# Patient Record
Sex: FEMALE | Race: WHITE | HISPANIC OR LATINO | Employment: OTHER | URBAN - METROPOLITAN AREA
[De-identification: names, ages, dates, MRNs, and addresses within clinical notes are randomized per-mention and may not be internally consistent; named-entity substitution may affect disease eponyms.]

---

## 2024-01-06 ENCOUNTER — HOSPITAL ENCOUNTER (OUTPATIENT)
Facility: CLINIC | Age: 74
Setting detail: OBSERVATION
Discharge: HOME | End: 2024-01-08
Attending: EMERGENCY MEDICINE | Admitting: STUDENT IN AN ORGANIZED HEALTH CARE EDUCATION/TRAINING PROGRAM
Payer: COMMERCIAL

## 2024-01-06 DIAGNOSIS — E87.29 HIGH ANION GAP METABOLIC ACIDOSIS: ICD-10-CM

## 2024-01-06 DIAGNOSIS — R06.00 DYSPNEA, UNSPECIFIED TYPE: ICD-10-CM

## 2024-01-06 DIAGNOSIS — J18.9 PNEUMONIA OF BOTH LUNGS DUE TO INFECTIOUS ORGANISM, UNSPECIFIED PART OF LUNG: ICD-10-CM

## 2024-01-06 DIAGNOSIS — J45.901 ASTHMA WITH ACUTE EXACERBATION, UNSPECIFIED ASTHMA SEVERITY, UNSPECIFIED WHETHER PERSISTENT: ICD-10-CM

## 2024-01-06 DIAGNOSIS — E87.6 HYPOKALEMIA: ICD-10-CM

## 2024-01-06 LAB
ATRIAL RATE - MUSE: 107 BPM
DIASTOLIC BLOOD PRESSURE - MUSE: NORMAL MMHG
INTERPRETATION ECG - MUSE: NORMAL
P AXIS - MUSE: 60 DEGREES
PR INTERVAL - MUSE: 128 MS
QRS DURATION - MUSE: 144 MS
QT - MUSE: 406 MS
QTC - MUSE: 542 MS
R AXIS - MUSE: 91 DEGREES
SYSTOLIC BLOOD PRESSURE - MUSE: NORMAL MMHG
T AXIS - MUSE: 39 DEGREES
VENTRICULAR RATE- MUSE: 107 BPM

## 2024-01-06 PROCEDURE — 93005 ELECTROCARDIOGRAM TRACING: CPT | Performed by: EMERGENCY MEDICINE

## 2024-01-06 PROCEDURE — 99285 EMERGENCY DEPT VISIT HI MDM: CPT | Mod: 25

## 2024-01-06 PROCEDURE — 250N000009 HC RX 250

## 2024-01-06 PROCEDURE — 94640 AIRWAY INHALATION TREATMENT: CPT

## 2024-01-06 RX ORDER — MAGNESIUM SULFATE HEPTAHYDRATE 40 MG/ML
2 INJECTION, SOLUTION INTRAVENOUS ONCE
Status: COMPLETED | OUTPATIENT
Start: 2024-01-07 | End: 2024-01-07

## 2024-01-06 RX ORDER — ONDANSETRON 2 MG/ML
4 INJECTION INTRAMUSCULAR; INTRAVENOUS ONCE
Status: COMPLETED | OUTPATIENT
Start: 2024-01-07 | End: 2024-01-07

## 2024-01-06 RX ORDER — IPRATROPIUM BROMIDE AND ALBUTEROL SULFATE 2.5; .5 MG/3ML; MG/3ML
SOLUTION RESPIRATORY (INHALATION)
Status: COMPLETED
Start: 2024-01-06 | End: 2024-01-06

## 2024-01-06 RX ORDER — METHYLPREDNISOLONE SODIUM SUCCINATE 125 MG/2ML
125 INJECTION, POWDER, LYOPHILIZED, FOR SOLUTION INTRAMUSCULAR; INTRAVENOUS ONCE
Status: COMPLETED | OUTPATIENT
Start: 2024-01-07 | End: 2024-01-07

## 2024-01-06 RX ORDER — IPRATROPIUM BROMIDE AND ALBUTEROL SULFATE 2.5; .5 MG/3ML; MG/3ML
3 SOLUTION RESPIRATORY (INHALATION)
Status: COMPLETED | OUTPATIENT
Start: 2024-01-07 | End: 2024-01-07

## 2024-01-06 RX ADMIN — IPRATROPIUM BROMIDE AND ALBUTEROL SULFATE 3 ML: .5; 3 SOLUTION RESPIRATORY (INHALATION) at 23:50

## 2024-01-07 ENCOUNTER — APPOINTMENT (OUTPATIENT)
Dept: RADIOLOGY | Facility: CLINIC | Age: 74
End: 2024-01-07
Attending: EMERGENCY MEDICINE
Payer: COMMERCIAL

## 2024-01-07 ENCOUNTER — APPOINTMENT (OUTPATIENT)
Dept: CT IMAGING | Facility: CLINIC | Age: 74
End: 2024-01-07
Attending: EMERGENCY MEDICINE
Payer: COMMERCIAL

## 2024-01-07 PROBLEM — E87.6 HYPOKALEMIA: Status: ACTIVE | Noted: 2024-01-07

## 2024-01-07 PROBLEM — R06.00 DYSPNEA, UNSPECIFIED TYPE: Status: ACTIVE | Noted: 2024-01-07

## 2024-01-07 PROBLEM — E87.29 HIGH ANION GAP METABOLIC ACIDOSIS: Status: ACTIVE | Noted: 2024-01-07

## 2024-01-07 PROBLEM — J45.901 ASTHMA WITH ACUTE EXACERBATION, UNSPECIFIED ASTHMA SEVERITY, UNSPECIFIED WHETHER PERSISTENT: Status: ACTIVE | Noted: 2024-01-07

## 2024-01-07 LAB
ALBUMIN UR-MCNC: 30 MG/DL
ANION GAP SERPL CALCULATED.3IONS-SCNC: 12 MMOL/L (ref 7–15)
ANION GAP SERPL CALCULATED.3IONS-SCNC: 19 MMOL/L (ref 7–15)
APPEARANCE UR: CLEAR
B-OH-BUTYR SERPL-SCNC: 2.1 MMOL/L
B-OH-BUTYR SERPL-SCNC: 2.6 MMOL/L
BASOPHILS # BLD AUTO: NORMAL 10*3/UL
BASOPHILS # BLD MANUAL: 0 10E3/UL (ref 0–0.2)
BASOPHILS NFR BLD AUTO: NORMAL %
BASOPHILS NFR BLD MANUAL: 0 %
BILIRUB UR QL STRIP: NEGATIVE
BUN SERPL-MCNC: 16.9 MG/DL (ref 8–23)
BUN SERPL-MCNC: 18 MG/DL (ref 8–23)
CALCIUM SERPL-MCNC: 9.1 MG/DL (ref 8.8–10.2)
CALCIUM SERPL-MCNC: 9.4 MG/DL (ref 8.8–10.2)
CHLORIDE SERPL-SCNC: 91 MMOL/L (ref 98–107)
CHLORIDE SERPL-SCNC: 96 MMOL/L (ref 98–107)
COLOR UR AUTO: ABNORMAL
CREAT SERPL-MCNC: 0.72 MG/DL (ref 0.51–0.95)
CREAT SERPL-MCNC: 0.74 MG/DL (ref 0.51–0.95)
CREAT SERPL-MCNC: 0.81 MG/DL (ref 0.51–0.95)
D DIMER PPP FEU-MCNC: 3.87 UG/ML FEU (ref 0–0.5)
DEPRECATED HCO3 PLAS-SCNC: 18 MMOL/L (ref 22–29)
DEPRECATED HCO3 PLAS-SCNC: 22 MMOL/L (ref 22–29)
EGFRCR SERPLBLD CKD-EPI 2021: 77 ML/MIN/1.73M2
EGFRCR SERPLBLD CKD-EPI 2021: 85 ML/MIN/1.73M2
EGFRCR SERPLBLD CKD-EPI 2021: 88 ML/MIN/1.73M2
EOSINOPHIL # BLD AUTO: NORMAL 10*3/UL
EOSINOPHIL # BLD MANUAL: 0 10E3/UL (ref 0–0.7)
EOSINOPHIL NFR BLD AUTO: NORMAL %
EOSINOPHIL NFR BLD MANUAL: 0 %
ERYTHROCYTE [DISTWIDTH] IN BLOOD BY AUTOMATED COUNT: 13.3 % (ref 10–15)
ERYTHROCYTE [DISTWIDTH] IN BLOOD BY AUTOMATED COUNT: 13.4 % (ref 10–15)
FLUAV RNA SPEC QL NAA+PROBE: NEGATIVE
FLUBV RNA RESP QL NAA+PROBE: NEGATIVE
GLUCOSE BLDC GLUCOMTR-MCNC: 241 MG/DL (ref 70–99)
GLUCOSE BLDC GLUCOMTR-MCNC: 261 MG/DL (ref 70–99)
GLUCOSE BLDC GLUCOMTR-MCNC: 262 MG/DL (ref 70–99)
GLUCOSE BLDC GLUCOMTR-MCNC: 296 MG/DL (ref 70–99)
GLUCOSE BLDC GLUCOMTR-MCNC: 296 MG/DL (ref 70–99)
GLUCOSE BLDC GLUCOMTR-MCNC: 323 MG/DL (ref 70–99)
GLUCOSE SERPL-MCNC: 207 MG/DL (ref 70–99)
GLUCOSE SERPL-MCNC: 314 MG/DL (ref 70–99)
GLUCOSE UR STRIP-MCNC: 30 MG/DL
HBA1C MFR BLD: 6.8 %
HCT VFR BLD AUTO: 33.2 % (ref 35–47)
HCT VFR BLD AUTO: 35.2 % (ref 35–47)
HGB BLD-MCNC: 11 G/DL (ref 11.7–15.7)
HGB BLD-MCNC: 11.7 G/DL (ref 11.7–15.7)
HGB UR QL STRIP: ABNORMAL
IMM GRANULOCYTES # BLD: NORMAL 10*3/UL
IMM GRANULOCYTES NFR BLD: NORMAL %
KETONES UR STRIP-MCNC: NEGATIVE MG/DL
LACTATE SERPL-SCNC: 2 MMOL/L (ref 0.7–2)
LEUKOCYTE ESTERASE UR QL STRIP: NEGATIVE
LYMPHOCYTES # BLD AUTO: NORMAL 10*3/UL
LYMPHOCYTES # BLD MANUAL: 0.6 10E3/UL (ref 0.8–5.3)
LYMPHOCYTES NFR BLD AUTO: NORMAL %
LYMPHOCYTES NFR BLD MANUAL: 6 %
MAGNESIUM SERPL-MCNC: 1.6 MG/DL (ref 1.7–2.3)
MAGNESIUM SERPL-MCNC: 2.4 MG/DL (ref 1.7–2.3)
MCH RBC QN AUTO: 28.5 PG (ref 26.5–33)
MCH RBC QN AUTO: 28.6 PG (ref 26.5–33)
MCHC RBC AUTO-ENTMCNC: 33.1 G/DL (ref 31.5–36.5)
MCHC RBC AUTO-ENTMCNC: 33.2 G/DL (ref 31.5–36.5)
MCV RBC AUTO: 86 FL (ref 78–100)
MCV RBC AUTO: 86 FL (ref 78–100)
MONOCYTES # BLD AUTO: NORMAL 10*3/UL
MONOCYTES # BLD MANUAL: 0.9 10E3/UL (ref 0–1.3)
MONOCYTES NFR BLD AUTO: NORMAL %
MONOCYTES NFR BLD MANUAL: 9 %
MUCOUS THREADS #/AREA URNS LPF: PRESENT /LPF
NEUTROPHILS # BLD AUTO: NORMAL 10*3/UL
NEUTROPHILS # BLD MANUAL: 8.7 10E3/UL (ref 1.6–8.3)
NEUTROPHILS NFR BLD AUTO: NORMAL %
NEUTROPHILS NFR BLD MANUAL: 85 %
NITRATE UR QL: NEGATIVE
NRBC # BLD AUTO: 0 10E3/UL
NRBC BLD AUTO-RTO: 0 /100
NT-PROBNP SERPL-MCNC: 195 PG/ML (ref 0–900)
PH UR STRIP: 6.5 [PH] (ref 5–7)
PLAT MORPH BLD: ABNORMAL
PLATELET # BLD AUTO: 251 10E3/UL (ref 150–450)
PLATELET # BLD AUTO: 263 10E3/UL (ref 150–450)
POTASSIUM SERPL-SCNC: 3 MMOL/L (ref 3.4–5.3)
POTASSIUM SERPL-SCNC: 3.9 MMOL/L (ref 3.4–5.3)
PROCALCITONIN SERPL IA-MCNC: 0.63 NG/ML
RBC # BLD AUTO: 3.86 10E6/UL (ref 3.8–5.2)
RBC # BLD AUTO: 4.09 10E6/UL (ref 3.8–5.2)
RBC MORPH BLD: ABNORMAL
RBC URINE: <1 /HPF
RSV RNA SPEC NAA+PROBE: NEGATIVE
S PNEUM AG SPEC QL: NEGATIVE
SARS-COV-2 RNA RESP QL NAA+PROBE: NEGATIVE
SODIUM SERPL-SCNC: 128 MMOL/L (ref 135–145)
SODIUM SERPL-SCNC: 130 MMOL/L (ref 135–145)
SP GR UR STRIP: 1.03 (ref 1–1.03)
SQUAMOUS EPITHELIAL: 2 /HPF
TROPONIN T SERPL HS-MCNC: 14 NG/L
TROPONIN T SERPL HS-MCNC: 15 NG/L
UROBILINOGEN UR STRIP-MCNC: <2 MG/DL
WBC # BLD AUTO: 10 10E3/UL (ref 4–11)
WBC # BLD AUTO: 10.2 10E3/UL (ref 4–11)
WBC URINE: 1 /HPF

## 2024-01-07 PROCEDURE — 250N000011 HC RX IP 250 OP 636: Performed by: HOSPITALIST

## 2024-01-07 PROCEDURE — 82565 ASSAY OF CREATININE: CPT | Performed by: HOSPITALIST

## 2024-01-07 PROCEDURE — 84484 ASSAY OF TROPONIN QUANT: CPT | Performed by: EMERGENCY MEDICINE

## 2024-01-07 PROCEDURE — 94799 UNLISTED PULMONARY SVC/PX: CPT

## 2024-01-07 PROCEDURE — 96367 TX/PROPH/DG ADDL SEQ IV INF: CPT

## 2024-01-07 PROCEDURE — 96361 HYDRATE IV INFUSION ADD-ON: CPT

## 2024-01-07 PROCEDURE — 71275 CT ANGIOGRAPHY CHEST: CPT

## 2024-01-07 PROCEDURE — 250N000013 HC RX MED GY IP 250 OP 250 PS 637: Performed by: STUDENT IN AN ORGANIZED HEALTH CARE EDUCATION/TRAINING PROGRAM

## 2024-01-07 PROCEDURE — 250N000012 HC RX MED GY IP 250 OP 636 PS 637: Performed by: HOSPITALIST

## 2024-01-07 PROCEDURE — 250N000013 HC RX MED GY IP 250 OP 250 PS 637: Performed by: EMERGENCY MEDICINE

## 2024-01-07 PROCEDURE — 94640 AIRWAY INHALATION TREATMENT: CPT | Mod: 76

## 2024-01-07 PROCEDURE — 96365 THER/PROPH/DIAG IV INF INIT: CPT | Mod: 59

## 2024-01-07 PROCEDURE — 83605 ASSAY OF LACTIC ACID: CPT | Performed by: HOSPITALIST

## 2024-01-07 PROCEDURE — 250N000009 HC RX 250: Performed by: EMERGENCY MEDICINE

## 2024-01-07 PROCEDURE — 83880 ASSAY OF NATRIURETIC PEPTIDE: CPT | Performed by: EMERGENCY MEDICINE

## 2024-01-07 PROCEDURE — 999N000157 HC STATISTIC RCP TIME EA 10 MIN

## 2024-01-07 PROCEDURE — 36415 COLL VENOUS BLD VENIPUNCTURE: CPT | Performed by: EMERGENCY MEDICINE

## 2024-01-07 PROCEDURE — 96376 TX/PRO/DX INJ SAME DRUG ADON: CPT | Mod: 59

## 2024-01-07 PROCEDURE — 82010 KETONE BODYS QUAN: CPT | Performed by: EMERGENCY MEDICINE

## 2024-01-07 PROCEDURE — 85027 COMPLETE CBC AUTOMATED: CPT | Performed by: EMERGENCY MEDICINE

## 2024-01-07 PROCEDURE — 36415 COLL VENOUS BLD VENIPUNCTURE: CPT | Performed by: HOSPITALIST

## 2024-01-07 PROCEDURE — 85027 COMPLETE CBC AUTOMATED: CPT | Mod: 91 | Performed by: HOSPITALIST

## 2024-01-07 PROCEDURE — 96375 TX/PRO/DX INJ NEW DRUG ADDON: CPT | Mod: 59

## 2024-01-07 PROCEDURE — 80048 BASIC METABOLIC PNL TOTAL CA: CPT | Performed by: EMERGENCY MEDICINE

## 2024-01-07 PROCEDURE — 94660 CPAP INITIATION&MGMT: CPT

## 2024-01-07 PROCEDURE — 81001 URINALYSIS AUTO W/SCOPE: CPT | Performed by: STUDENT IN AN ORGANIZED HEALTH CARE EDUCATION/TRAINING PROGRAM

## 2024-01-07 PROCEDURE — 99222 1ST HOSP IP/OBS MODERATE 55: CPT | Performed by: HOSPITALIST

## 2024-01-07 PROCEDURE — 85379 FIBRIN DEGRADATION QUANT: CPT | Performed by: EMERGENCY MEDICINE

## 2024-01-07 PROCEDURE — 82010 KETONE BODYS QUAN: CPT | Mod: 91 | Performed by: HOSPITALIST

## 2024-01-07 PROCEDURE — 258N000003 HC RX IP 258 OP 636: Performed by: HOSPITALIST

## 2024-01-07 PROCEDURE — 99207 PR APP CREDIT; MD BILLING SHARED VISIT: CPT | Performed by: STUDENT IN AN ORGANIZED HEALTH CARE EDUCATION/TRAINING PROGRAM

## 2024-01-07 PROCEDURE — 250N000011 HC RX IP 250 OP 636: Performed by: EMERGENCY MEDICINE

## 2024-01-07 PROCEDURE — 120N000001 HC R&B MED SURG/OB

## 2024-01-07 PROCEDURE — 83735 ASSAY OF MAGNESIUM: CPT | Performed by: EMERGENCY MEDICINE

## 2024-01-07 PROCEDURE — 82962 GLUCOSE BLOOD TEST: CPT

## 2024-01-07 PROCEDURE — 250N000009 HC RX 250: Performed by: HOSPITALIST

## 2024-01-07 PROCEDURE — 87899 AGENT NOS ASSAY W/OPTIC: CPT | Performed by: STUDENT IN AN ORGANIZED HEALTH CARE EDUCATION/TRAINING PROGRAM

## 2024-01-07 PROCEDURE — 85007 BL SMEAR W/DIFF WBC COUNT: CPT | Performed by: EMERGENCY MEDICINE

## 2024-01-07 PROCEDURE — 83735 ASSAY OF MAGNESIUM: CPT | Performed by: HOSPITALIST

## 2024-01-07 PROCEDURE — 84145 PROCALCITONIN (PCT): CPT | Performed by: EMERGENCY MEDICINE

## 2024-01-07 PROCEDURE — 71045 X-RAY EXAM CHEST 1 VIEW: CPT

## 2024-01-07 PROCEDURE — 96372 THER/PROPH/DIAG INJ SC/IM: CPT | Mod: 59 | Performed by: HOSPITALIST

## 2024-01-07 PROCEDURE — 87637 SARSCOV2&INF A&B&RSV AMP PRB: CPT | Performed by: EMERGENCY MEDICINE

## 2024-01-07 PROCEDURE — 83036 HEMOGLOBIN GLYCOSYLATED A1C: CPT | Performed by: HOSPITALIST

## 2024-01-07 RX ORDER — METHYLPREDNISOLONE SODIUM SUCCINATE 40 MG/ML
40 INJECTION, POWDER, LYOPHILIZED, FOR SOLUTION INTRAMUSCULAR; INTRAVENOUS DAILY
Status: DISCONTINUED | OUTPATIENT
Start: 2024-01-07 | End: 2024-01-08 | Stop reason: HOSPADM

## 2024-01-07 RX ORDER — LOSARTAN POTASSIUM 100 MG/1
100 TABLET ORAL DAILY
COMMUNITY
Start: 2024-01-04

## 2024-01-07 RX ORDER — NALOXONE HYDROCHLORIDE 0.4 MG/ML
0.4 INJECTION, SOLUTION INTRAMUSCULAR; INTRAVENOUS; SUBCUTANEOUS
Status: DISCONTINUED | OUTPATIENT
Start: 2024-01-07 | End: 2024-01-08 | Stop reason: HOSPADM

## 2024-01-07 RX ORDER — ACETYLCYSTEINE 200 MG/ML
4 SOLUTION ORAL; RESPIRATORY (INHALATION)
Status: DISCONTINUED | OUTPATIENT
Start: 2024-01-07 | End: 2024-01-08 | Stop reason: HOSPADM

## 2024-01-07 RX ORDER — GABAPENTIN 600 MG/1
600 TABLET ORAL DAILY
Status: DISCONTINUED | OUTPATIENT
Start: 2024-01-07 | End: 2024-01-08 | Stop reason: HOSPADM

## 2024-01-07 RX ORDER — QUETIAPINE FUMARATE 100 MG/1
100 TABLET, FILM COATED ORAL AT BEDTIME
COMMUNITY
Start: 2023-12-13

## 2024-01-07 RX ORDER — GABAPENTIN 600 MG/1
600 TABLET ORAL DAILY
COMMUNITY
Start: 2023-09-17

## 2024-01-07 RX ORDER — POTASSIUM CHLORIDE 7.45 MG/ML
10 INJECTION INTRAVENOUS ONCE
Status: COMPLETED | OUTPATIENT
Start: 2024-01-07 | End: 2024-01-07

## 2024-01-07 RX ORDER — NALOXONE HYDROCHLORIDE 0.4 MG/ML
0.2 INJECTION, SOLUTION INTRAMUSCULAR; INTRAVENOUS; SUBCUTANEOUS
Status: DISCONTINUED | OUTPATIENT
Start: 2024-01-07 | End: 2024-01-08 | Stop reason: HOSPADM

## 2024-01-07 RX ORDER — MONTELUKAST SODIUM 10 MG/1
10 TABLET ORAL DAILY
COMMUNITY
Start: 2023-12-08

## 2024-01-07 RX ORDER — IPRATROPIUM BROMIDE AND ALBUTEROL SULFATE 2.5; .5 MG/3ML; MG/3ML
3 SOLUTION RESPIRATORY (INHALATION)
Status: DISCONTINUED | OUTPATIENT
Start: 2024-01-07 | End: 2024-01-07

## 2024-01-07 RX ORDER — ONDANSETRON 2 MG/ML
4 INJECTION INTRAMUSCULAR; INTRAVENOUS EVERY 6 HOURS PRN
Status: DISCONTINUED | OUTPATIENT
Start: 2024-01-07 | End: 2024-01-08 | Stop reason: HOSPADM

## 2024-01-07 RX ORDER — CARBOXYMETHYLCELLULOSE SODIUM AND GLYCERIN 5; 9 MG/ML; MG/ML
1 SOLUTION/ DROPS OPHTHALMIC 4 TIMES DAILY
COMMUNITY
Start: 2023-11-06

## 2024-01-07 RX ORDER — LEVOFLOXACIN 5 MG/ML
500 INJECTION, SOLUTION INTRAVENOUS EVERY 24 HOURS
Status: DISCONTINUED | OUTPATIENT
Start: 2024-01-07 | End: 2024-01-08 | Stop reason: HOSPADM

## 2024-01-07 RX ORDER — PROCHLORPERAZINE 25 MG
12.5 SUPPOSITORY, RECTAL RECTAL EVERY 12 HOURS PRN
Status: DISCONTINUED | OUTPATIENT
Start: 2024-01-07 | End: 2024-01-08 | Stop reason: HOSPADM

## 2024-01-07 RX ORDER — POTASSIUM CHLORIDE 1500 MG/1
40 TABLET, EXTENDED RELEASE ORAL ONCE
Status: COMPLETED | OUTPATIENT
Start: 2024-01-07 | End: 2024-01-07

## 2024-01-07 RX ORDER — ALBUTEROL SULFATE 0.83 MG/ML
2.5 SOLUTION RESPIRATORY (INHALATION)
Status: DISCONTINUED | OUTPATIENT
Start: 2024-01-07 | End: 2024-01-08 | Stop reason: HOSPADM

## 2024-01-07 RX ORDER — SIMVASTATIN 40 MG
40 TABLET ORAL AT BEDTIME
COMMUNITY
Start: 2024-01-04

## 2024-01-07 RX ORDER — THEOPHYLLINE 100 MG/1
300 TABLET, EXTENDED RELEASE ORAL DAILY
Status: DISCONTINUED | OUTPATIENT
Start: 2024-01-08 | End: 2024-01-08 | Stop reason: HOSPADM

## 2024-01-07 RX ORDER — PAROXETINE 20 MG/1
20 TABLET, FILM COATED ORAL DAILY
Status: DISCONTINUED | OUTPATIENT
Start: 2024-01-08 | End: 2024-01-08 | Stop reason: HOSPADM

## 2024-01-07 RX ORDER — THEOPHYLLINE 300 MG/1
300 TABLET, EXTENDED RELEASE ORAL DAILY
COMMUNITY
Start: 2023-12-08

## 2024-01-07 RX ORDER — IOPAMIDOL 755 MG/ML
90 INJECTION, SOLUTION INTRAVASCULAR ONCE
Status: COMPLETED | OUTPATIENT
Start: 2024-01-07 | End: 2024-01-07

## 2024-01-07 RX ORDER — QUETIAPINE FUMARATE 25 MG/1
100 TABLET, FILM COATED ORAL AT BEDTIME
Status: DISCONTINUED | OUTPATIENT
Start: 2024-01-07 | End: 2024-01-08 | Stop reason: HOSPADM

## 2024-01-07 RX ORDER — SPIRONOLACTONE 25 MG/1
25 TABLET ORAL DAILY
COMMUNITY
Start: 2023-12-15

## 2024-01-07 RX ORDER — CETIRIZINE HYDROCHLORIDE 10 MG/1
10 TABLET ORAL DAILY
COMMUNITY
Start: 2023-09-12

## 2024-01-07 RX ORDER — CARBOXYMETHYLCELLULOSE SODIUM 10 MG/ML
1 GEL OPHTHALMIC 4 TIMES DAILY
Status: DISCONTINUED | OUTPATIENT
Start: 2024-01-07 | End: 2024-01-08 | Stop reason: HOSPADM

## 2024-01-07 RX ORDER — LEVOTHYROXINE SODIUM 50 UG/1
50 TABLET ORAL DAILY
Status: DISCONTINUED | OUTPATIENT
Start: 2024-01-07 | End: 2024-01-08 | Stop reason: HOSPADM

## 2024-01-07 RX ORDER — NICOTINE POLACRILEX 4 MG
15-30 LOZENGE BUCCAL
Status: DISCONTINUED | OUTPATIENT
Start: 2024-01-07 | End: 2024-01-08 | Stop reason: HOSPADM

## 2024-01-07 RX ORDER — DAPAGLIFLOZIN AND METFORMIN HYDROCHLORIDE 5; 1000 MG/1; MG/1
1 TABLET, FILM COATED, EXTENDED RELEASE ORAL
COMMUNITY
Start: 2023-11-06

## 2024-01-07 RX ORDER — PAROXETINE HYDROCHLORIDE HEMIHYDRATE 25 MG/1
25 TABLET, FILM COATED, EXTENDED RELEASE ORAL EVERY MORNING
COMMUNITY
Start: 2023-09-14

## 2024-01-07 RX ORDER — LEUCOVORIN/PYRIDOX/MECOBALAMIN 4-50-2 MG
1 TABLET ORAL DAILY
COMMUNITY
Start: 2023-11-09

## 2024-01-07 RX ORDER — CLONAZEPAM 1 MG/1
1 TABLET ORAL AT BEDTIME
COMMUNITY
Start: 2023-12-07

## 2024-01-07 RX ORDER — PROCHLORPERAZINE MALEATE 5 MG
5 TABLET ORAL EVERY 6 HOURS PRN
Status: DISCONTINUED | OUTPATIENT
Start: 2024-01-07 | End: 2024-01-08 | Stop reason: HOSPADM

## 2024-01-07 RX ORDER — GUAIFENESIN/DEXTROMETHORPHAN 100-10MG/5
10 SYRUP ORAL EVERY 4 HOURS PRN
Status: DISCONTINUED | OUTPATIENT
Start: 2024-01-07 | End: 2024-01-08 | Stop reason: HOSPADM

## 2024-01-07 RX ORDER — IPRATROPIUM BROMIDE AND ALBUTEROL SULFATE 2.5; .5 MG/3ML; MG/3ML
3 SOLUTION RESPIRATORY (INHALATION)
Status: DISCONTINUED | OUTPATIENT
Start: 2024-01-07 | End: 2024-01-08 | Stop reason: HOSPADM

## 2024-01-07 RX ORDER — CYCLOSPORINE 0.5 MG/ML
1 EMULSION OPHTHALMIC 2 TIMES DAILY
COMMUNITY
Start: 2023-11-09

## 2024-01-07 RX ORDER — HYDROMORPHONE HCL IN WATER/PF 6 MG/30 ML
0.4 PATIENT CONTROLLED ANALGESIA SYRINGE INTRAVENOUS
Status: DISCONTINUED | OUTPATIENT
Start: 2024-01-07 | End: 2024-01-08 | Stop reason: HOSPADM

## 2024-01-07 RX ORDER — ENOXAPARIN SODIUM 100 MG/ML
40 INJECTION SUBCUTANEOUS EVERY 24 HOURS
Status: DISCONTINUED | OUTPATIENT
Start: 2024-01-07 | End: 2024-01-08 | Stop reason: HOSPADM

## 2024-01-07 RX ORDER — SODIUM CHLORIDE 9 MG/ML
INJECTION, SOLUTION INTRAVENOUS CONTINUOUS
Status: DISCONTINUED | OUTPATIENT
Start: 2024-01-07 | End: 2024-01-07

## 2024-01-07 RX ORDER — AMOXICILLIN 250 MG
1 CAPSULE ORAL 2 TIMES DAILY PRN
Status: DISCONTINUED | OUTPATIENT
Start: 2024-01-07 | End: 2024-01-08 | Stop reason: HOSPADM

## 2024-01-07 RX ORDER — LEVOTHYROXINE SODIUM 50 UG/1
50 TABLET ORAL DAILY
COMMUNITY
Start: 2024-01-04

## 2024-01-07 RX ORDER — AMOXICILLIN 250 MG
2 CAPSULE ORAL 2 TIMES DAILY PRN
Status: DISCONTINUED | OUTPATIENT
Start: 2024-01-07 | End: 2024-01-08 | Stop reason: HOSPADM

## 2024-01-07 RX ORDER — DEXTROSE MONOHYDRATE 25 G/50ML
25-50 INJECTION, SOLUTION INTRAVENOUS
Status: DISCONTINUED | OUTPATIENT
Start: 2024-01-07 | End: 2024-01-08

## 2024-01-07 RX ORDER — AMLODIPINE BESYLATE 5 MG/1
5 TABLET ORAL DAILY
COMMUNITY
Start: 2023-12-15

## 2024-01-07 RX ORDER — NICOTINE POLACRILEX 4 MG
15-30 LOZENGE BUCCAL
Status: DISCONTINUED | OUTPATIENT
Start: 2024-01-07 | End: 2024-01-08

## 2024-01-07 RX ORDER — MONTELUKAST SODIUM 10 MG/1
10 TABLET ORAL DAILY
Status: DISCONTINUED | OUTPATIENT
Start: 2024-01-07 | End: 2024-01-08 | Stop reason: HOSPADM

## 2024-01-07 RX ORDER — CLONAZEPAM 1 MG/1
1 TABLET ORAL AT BEDTIME
Status: DISCONTINUED | OUTPATIENT
Start: 2024-01-07 | End: 2024-01-08 | Stop reason: HOSPADM

## 2024-01-07 RX ORDER — SIMVASTATIN 40 MG
40 TABLET ORAL AT BEDTIME
Status: DISCONTINUED | OUTPATIENT
Start: 2024-01-07 | End: 2024-01-08 | Stop reason: HOSPADM

## 2024-01-07 RX ORDER — DEXTROSE MONOHYDRATE 25 G/50ML
25-50 INJECTION, SOLUTION INTRAVENOUS
Status: DISCONTINUED | OUTPATIENT
Start: 2024-01-07 | End: 2024-01-08 | Stop reason: HOSPADM

## 2024-01-07 RX ORDER — AMLODIPINE BESYLATE 5 MG/1
5 TABLET ORAL DAILY
Status: DISCONTINUED | OUTPATIENT
Start: 2024-01-07 | End: 2024-01-08 | Stop reason: HOSPADM

## 2024-01-07 RX ORDER — ACETAMINOPHEN 650 MG/1
650 SUPPOSITORY RECTAL EVERY 4 HOURS PRN
Status: DISCONTINUED | OUTPATIENT
Start: 2024-01-07 | End: 2024-01-08 | Stop reason: HOSPADM

## 2024-01-07 RX ORDER — CYCLOSPORINE 0.5 MG/ML
1 EMULSION OPHTHALMIC 2 TIMES DAILY
Status: DISCONTINUED | OUTPATIENT
Start: 2024-01-07 | End: 2024-01-08 | Stop reason: HOSPADM

## 2024-01-07 RX ORDER — BIMATOPROST 0.1 MG/ML
1 SOLUTION/ DROPS OPHTHALMIC AT BEDTIME
COMMUNITY
Start: 2023-11-29

## 2024-01-07 RX ORDER — ACETAMINOPHEN 325 MG/1
650 TABLET ORAL EVERY 4 HOURS PRN
Status: DISCONTINUED | OUTPATIENT
Start: 2024-01-07 | End: 2024-01-08 | Stop reason: HOSPADM

## 2024-01-07 RX ORDER — ONDANSETRON 4 MG/1
4 TABLET, ORALLY DISINTEGRATING ORAL EVERY 6 HOURS PRN
Status: DISCONTINUED | OUTPATIENT
Start: 2024-01-07 | End: 2024-01-08 | Stop reason: HOSPADM

## 2024-01-07 RX ORDER — HYDROMORPHONE HCL IN WATER/PF 6 MG/30 ML
0.2 PATIENT CONTROLLED ANALGESIA SYRINGE INTRAVENOUS
Status: DISCONTINUED | OUTPATIENT
Start: 2024-01-07 | End: 2024-01-08 | Stop reason: HOSPADM

## 2024-01-07 RX ADMIN — IPRATROPIUM BROMIDE AND ALBUTEROL SULFATE 3 ML: .5; 3 SOLUTION RESPIRATORY (INHALATION) at 00:10

## 2024-01-07 RX ADMIN — SODIUM CHLORIDE, PRESERVATIVE FREE: 5 INJECTION INTRAVENOUS at 08:45

## 2024-01-07 RX ADMIN — INSULIN ASPART 4 UNITS: 100 INJECTION, SOLUTION INTRAVENOUS; SUBCUTANEOUS at 06:46

## 2024-01-07 RX ADMIN — SODIUM CHLORIDE, PRESERVATIVE FREE: 5 INJECTION INTRAVENOUS at 03:05

## 2024-01-07 RX ADMIN — IPRATROPIUM BROMIDE AND ALBUTEROL SULFATE 3 ML: .5; 3 SOLUTION RESPIRATORY (INHALATION) at 12:27

## 2024-01-07 RX ADMIN — INSULIN ASPART 3 UNITS: 100 INJECTION, SOLUTION INTRAVENOUS; SUBCUTANEOUS at 03:02

## 2024-01-07 RX ADMIN — IPRATROPIUM BROMIDE AND ALBUTEROL SULFATE 3 ML: .5; 3 SOLUTION RESPIRATORY (INHALATION) at 16:13

## 2024-01-07 RX ADMIN — IPRATROPIUM BROMIDE AND ALBUTEROL SULFATE 3 ML: .5; 3 SOLUTION RESPIRATORY (INHALATION) at 20:20

## 2024-01-07 RX ADMIN — IPRATROPIUM BROMIDE AND ALBUTEROL SULFATE 3 ML: .5; 3 SOLUTION RESPIRATORY (INHALATION) at 07:48

## 2024-01-07 RX ADMIN — ENOXAPARIN SODIUM 40 MG: 100 INJECTION SUBCUTANEOUS at 06:46

## 2024-01-07 RX ADMIN — LEVOFLOXACIN 500 MG: 500 INJECTION, SOLUTION INTRAVENOUS at 03:04

## 2024-01-07 RX ADMIN — IPRATROPIUM BROMIDE AND ALBUTEROL SULFATE 3 ML: .5; 3 SOLUTION RESPIRATORY (INHALATION) at 00:00

## 2024-01-07 RX ADMIN — QUETIAPINE FUMARATE 100 MG: 25 TABLET ORAL at 21:57

## 2024-01-07 RX ADMIN — GABAPENTIN 600 MG: 600 TABLET, FILM COATED ORAL at 19:08

## 2024-01-07 RX ADMIN — INSULIN ASPART 4 UNITS: 100 INJECTION, SOLUTION INTRAVENOUS; SUBCUTANEOUS at 10:26

## 2024-01-07 RX ADMIN — IOPAMIDOL 90 ML: 755 INJECTION, SOLUTION INTRAVENOUS at 03:57

## 2024-01-07 RX ADMIN — INSULIN ASPART 3 UNITS: 100 INJECTION, SOLUTION INTRAVENOUS; SUBCUTANEOUS at 14:48

## 2024-01-07 RX ADMIN — POTASSIUM CHLORIDE 40 MEQ: 1500 TABLET, EXTENDED RELEASE ORAL at 01:46

## 2024-01-07 RX ADMIN — SIMVASTATIN 40 MG: 40 TABLET, FILM COATED ORAL at 21:57

## 2024-01-07 RX ADMIN — CLONAZEPAM 1 MG: 1 TABLET ORAL at 21:56

## 2024-01-07 RX ADMIN — ONDANSETRON 4 MG: 2 INJECTION INTRAMUSCULAR; INTRAVENOUS at 00:15

## 2024-01-07 RX ADMIN — BIMATOPROST 1 DROP: 0.1 SOLUTION/ DROPS OPHTHALMIC at 21:51

## 2024-01-07 RX ADMIN — METHYLPREDNISOLONE SODIUM SUCCINATE 125 MG: 125 INJECTION, POWDER, FOR SOLUTION INTRAMUSCULAR; INTRAVENOUS at 00:15

## 2024-01-07 RX ADMIN — MONTELUKAST SODIUM 10 MG: 10 TABLET, COATED ORAL at 19:08

## 2024-01-07 RX ADMIN — METHYLPREDNISOLONE SODIUM SUCCINATE 40 MG: 40 INJECTION, POWDER, FOR SOLUTION INTRAMUSCULAR; INTRAVENOUS at 08:29

## 2024-01-07 RX ADMIN — MAGNESIUM SULFATE HEPTAHYDRATE 2 G: 40 INJECTION, SOLUTION INTRAVENOUS at 00:19

## 2024-01-07 RX ADMIN — POTASSIUM CHLORIDE 10 MEQ: 7.46 INJECTION, SOLUTION INTRAVENOUS at 01:45

## 2024-01-07 ASSESSMENT — ACTIVITIES OF DAILY LIVING (ADL)
ADLS_ACUITY_SCORE: 37
ADLS_ACUITY_SCORE: 22
ADLS_ACUITY_SCORE: 37
ADLS_ACUITY_SCORE: 37
ADLS_ACUITY_SCORE: 22
ADLS_ACUITY_SCORE: 37

## 2024-01-07 NOTE — PROGRESS NOTES
Rashmi given X 3 this shift per orders. BS varied widely throughout this shift from clear and diminished to expiratory wheeze. Patient does experience increased aeration post nebs. Patient was started on Aerobika flutter valve this shift. Patient achieves pressure of 10-15 cm H20 on manometer. Patient performed 15-20 breaths after each nebulizer treatment. Patient performed a mock cough after each 10 breaths. Patient with a harsh asthmatic like cough. Bipap was discontinued and removed from patient's room. Patient remains on RA with oxygen saturation of 92-94%.    Sada Roa, BS, RRT, CTTS

## 2024-01-07 NOTE — PROGRESS NOTES
Patient removed from BIPAP at 0745 this morning. Placed on 1 L NC with oxygen saturation 95%. Duoneb given at this time as well. BS clear in upper lobes bilaterally and crackles in bases bilaterally pre and post neb. HR 70-71 and RR 24-26. Patient with shallow respirations. Loose cough with no sputum production at this time. Will provide patient with a Aerobika flutter valve at next treatment and instruct in use and cleaning. Patient's son will provide interpretation.    Sada Roa, JEIMY, RRT, CTTS

## 2024-01-07 NOTE — PLAN OF CARE
Problem: Adult Inpatient Plan of Care  Goal: Absence of Hospital-Acquired Illness or Injury  Outcome: Progressing   Goal Outcome Evaluation:    Pt AxO, no acute changes this shift, remains NPO. Pt denies pain, n/v, chest pain, dizziness or any new sx at time of assessment, no distress noted w/assessment. Pt reports improvement of sx from last night and states feeling better after tx. VSS on RA, respirations remain shallow, pt reports this sx is not baseline, mild insp/exp wheezing still noted pt states this sx is also improving. PIV w/NS @50 ml/hr. Pt on K and Mag protocols, to be rechecked 1/8/24 @0600. No further concerns as of present.

## 2024-01-07 NOTE — H&P
[facility] MEDICINE ADMISSION HISTORY AND PHYSICAL     Brief Synopsis:     Carmel Trotter is a 73 year old female who presented with complaints of cough, shortness of breath.  She is visiting family from [place].  She flew in on [place] and is scheduled to return on January 9.    Medical history is notable for asthma, diabetes, hypertension, questionable mitral valve insufficiency versus mitral valve prolapse.    Initial evaluation revealed tachycardia, tachypnea, slight hyponatremia, hypokalemia, low serum bicarb and elevated anion gap, magnesium 1.6, normal BNP, procalcitonin slightly elevated, troponin 15, D-dimer 3.9.  COVID, influenza, RSV negative.  Chest x-ray with low lung volumes otherwise clear lungs.  EKG was sinus rhythm, right bundle branch block.    Initial treatment included DuoNeb, magnesium, Solu-Medrol, Zofran    Assessment and Plan:  Acute respiratory distress  Acute asthma exacerbation  CT chest PE run pending to eval for PE or infiltrates  Continue scheduled nebs, BiPAP as needed, steroids  Will start levofloxacin for asthma with productive cough, elevated procalcitonin    Elevated anion gap acidosis  Low bicarb may be from hyperventilation  Checking lactic acid and serum ketones    Hyponatremia  Hypokalemia  Hypomagnesemia  Replace potassium and magnesium per protocol  Gentle normal saline for mild hyponatremia  Fluid water restriction 1.5 L    Non-insulin-dependent diabetes  Sliding scale insulin as needed    Essential hypertension  Mitral valve disease per patient report  Blood pressure currently well-controlled  Med rec is pending  BNP was within normal limits  Consider echocardiogram if not improving with treatment of reactive airways    Anxiety  Has situational anxiety related to flights  Asks if she can get something for anxiety for the flight home    Clinically Significant Risk Factors Present on Admission        # Hypokalemia: Lowest K = 3  mmol/L in last 2 days, will replace as needed  # Hyponatremia: Lowest Na = 128 mmol/L in last 2 days, will monitor as appropriate    # Hypomagnesemia: Lowest Mg = 1.6 mg/dL in last 2 days, will replace as needed  # Anion Gap Metabolic Acidosis: Highest Anion Gap = 19 mmol/L in last 2 days, will monitor and treat as appropriate        # Non-Invasive mechanical ventilation: current O2 Device: BiPAP/CPAP  # Acute hypoxic respiratory failure: continue supplemental O2 as needed         # Asthma: noted on problem list          DVTP: Lovenox prophylactic dose   Code Status: No Order  Disposition: Inpatient   Diet: N.p.o. for now  Fluids: Normal saline at 50/h    Disposition Plan      Expected Discharge Date: 01/09/2024               Chief Complaint Shortness of breath, cough     HISTORY   Carmel Trotter is a 73 year old female who presented with complaints of shortness of breath and cough.    Per ED provider:  Carmel Trotter is a 73 year old female who presents with shortness of breath.      History limited secondary to respiratory distress.      Patient has had shortness of breath for a few days that is worsening now prompting an ED visit. Patient does have a history of asthma and uses a nebulizer at home. Has also had a productive cough with upper chest tightness and nausea. Has been diaphoretic but no fever. History of type 2 diabetes mellitus. Son states he is mildly sick as well. Denies any fever.    History is obtained with the help of her son.  Describes about 10 days of cough and shortness of breath.  Her cough is productive of brown or green sputum.  She is felt feverish but her son said that he has been taking her temperature and it has not been elevated.  They have tried albuterol inhalers, cough medicine, tea but she just keeps getting worse.  No significant chest pain.  No nausea or vomiting.  She has had diarrhea about 3 or 4 stools a day.  Denies any significant lower extremity edema.  Lives in  [place] and is planning on flying back on January 9.  She really wants to make sure that she gets back to [place] because she has an upcoming colonoscopy.  Past Medical History     No past medical history on file.     Surgical History   History reviewed. No pertinent surgical history.  Family History    History reviewed. No pertinent family history.   Social History        Allergies     Allergies   Allergen Reactions    Penicillins Rash     Prior to Admission Medications      None      Review of Systems     A 12 point comprehensive review of systems was negative except as noted above in HPI.    PHYSICAL EXAMINATION     Vitals      Pulse:  [100-110] 100  Resp:  [32-70] 37  BP: (121-137)/(60-84) 134/65  FiO2 (%):  [40 %] 40 %  SpO2:  [95 %-100 %] 100 %    Examination   Physical Exam:    Gen: no acute distress, comfortable, obese, pleasant, speaks some English but son helps to interpret   ENT: Slight puffiness around her eyes and some conjunctival redness  Pulm: lungs are diffusely diminished but she is breathing relatively comfortably with BiPAP in place, she has some squeaky expiratory wheezes noted little bit more on the left  CV: regular rate and rhythm, little bit lower extremity edema with the right leg seeming a little bit more edematous than the left  GI: abdomen is soft, non-tender, non-distended with active bowel sounds.  MSK: no obvious deformities of the extremities  Derm: Not pale, no jaundice  Psych: appropriate affect      Pertinent Radiology     Radiology Results:   Recent Results (from the past 24 hour(s))   XR Chest Port 1 View    Narrative    EXAM: XR CHEST PORT 1 VIEW  LOCATION: [facility]  DATE: 1/7/2024    INDICATION: Shortness of breath  COMPARISON: None.      Impression    IMPRESSION: Low lung volumes. Heart size normal limits for AP technique. Pulmonary vascularity normal. The lungs are clear.     EKG Results: personally reviewed.  Sinus rhythm with right  bundle branch block.    Veto Villanueva DO  [facility] Medicine  [facility]   Phone: #[phone]

## 2024-01-07 NOTE — PROGRESS NOTES
RT Note:    Transported pt on BiPAP to CT and back to ED room with out complications. Pt rested on BiPAP overnight 12/6 R12 30%. Alternating mask to prevent skin breakdown. Pt with tachypnea even on BiPAP with RR:25-40bpm when assessed though she seems comfortable. Pt A&O and can make needs known to family member by bedside. Will continue to monitor and assess pt.

## 2024-01-07 NOTE — ED TRIAGE NOTES
Pt arrives to ed with son with frequent congested cough and sob.cp left side tightness denies nausea and dizziness.      Triage Assessment (Adult)       Row Name 01/06/24 7873          Triage Assessment    Airway WDL WDL        Respiratory WDL    Respiratory WDL X;rhythm/pattern;cough     Rhythm/Pattern, Respiratory shortness of breath;shallow     Cough Frequency frequent     Cough Type congested        Cardiac WDL    Cardiac WDL X;chest pain        Chest Pain Assessment    Chest Pain Location anterior chest, left     Character tightness     Precipitating Factors at rest

## 2024-01-07 NOTE — ED PROVIDER NOTES
EMERGENCY DEPARTMENT ENCOUNTER      NAME: Carmel Trotter  YOB: 1950  MRN: [id number]    FINAL IMPRESSION  1. Asthma with acute exacerbation, unspecified asthma severity, unspecified whether persistent    2. Dyspnea, unspecified type    3. Hypokalemia    4. High anion gap metabolic acidosis    5. Pneumonia of both lungs due to infectious organism, unspecified part of lung        MEDICAL DECISION MAKING   Pertinent Labs & Imaging studies reviewed. (See chart for details)    Carmel Trotter is a 73 year old female who presents for evaluation of dyspnea and cough.  Patient has a history of asthma and uses albuterol as needed at home.  Son reports that she has been feeling sick for about 1 week but worse over the last 2 days.  She has tried her home nebulizer treatments without improvement so he decided to bring her in for evaluation.  She has had a productive cough with associated chest tightness, nausea, sweating, and some lightheadedness.  Son notes that he has been a bit sick but not to the point that he is needed to be seen for his symptoms.  Patient has no associated fever, emesis, abdominal pain, or leg swelling.  Vitals on arrival notable for tachypnea, tachycardia, and significantly labored breathing with coarse breath sounds and wheezing in all lung fields.  Remainder of history and exam, as below.     I considered a broad differential including but not limited to asthma exacerbation, COPD exacerbation, viral URI, pneumonia, bronchitis, pulmonary edema, acute CHF, ACS/ischemia. Given the patient's history and exam findings, symptoms seem most likely secondary to exacerbation of reactive airway disease, possibly precipitated by viral illness. Patient agrees and reports symptoms are consistent with past exacerbations. Symptoms were not sudden in onset and there is no pleuritic CP, hypoxia, tachypnea, or back pain to suggest PE but cannot rule out due to tachypnea and tachycardia as well  as age. Discussed options for workup and management with patient and her son. We have agreed on plan for labs, EKG, portable chest x-ray, DuoNeb x 3, steroids, and magnesium.  Will also give a dose of Zofran given report of nausea.  RT was called to the department to assist in administering nebulizer treatments.    I rechecked the patient after she had received 2 nebulizer treatments.  She continued to have labored breathing with tachypnea and wheezing in all lung fields.  I do feel that she would benefit from BiPAP and she was amenable to trying this as well.  RT also agreed.    EKG revealed sinus tachycardia with right bundle branch block but no clear ischemic changes and no previous for comparison.  I rechecked the patient after she was placed on BiPAP and she had significant improvement in work of breathing and lung sounds.  She reported that she felt much improved as well.  I recommended that we keep her on this for now and plan for admission, which she was agreeable to.    ED Course as of 01/07/24 0607   Sun Jan 07, 2024   0058 XR Chest Port 1 View  I independently reviewed chest x-ray. Lung volumes do appear low but there is no obvious pneumothorax, infiltrate, pulmonary edema.   0059 D-Dimer Quantitative(!): 3.87  Dimer significantly elevated. Although would be atypical presentation for PE, will proceed with CT of chest to rule this or other pulmonary process out.    0114 Troponin T, High Sensitivity(!): 15  High sensitivity troponin slightly above age adjusted cutoff. ACS less likely in the setting of ECG without acute ischemic changes. Will plan to repeat at 2 hours.    0114 Basic metabolic panel(!)  BMP notable for metabolic acidosis with anion gap of 19 and bicarb of 18.  Glucose elevated at 207 and patient does have a known history of type 2 diabetes.  Sodium and chloride low at 128 and 91, respectively.  Potassium also slightly low at 3.0.  Will plan to replete potassium and magnesium was already given  on arrival.   0115 Magnesium(!): 1.6   0115 Procalcitonin(!): 0.63  Procalcitonin slightly elevated but would expect higher if symptoms related to bacterial pneumonia.   0115 N-Terminal Pro BNP Inpatient: 195  Within normal limits and no evidence of pulmonary edema on chest x-ray.   0115 Symptomatic Influenza A/B, RSV, & SARS-CoV2 PCR (COVID-19) Nasopharyngeal  Viral swabs all negative.       Preliminary workup today was notable for the above. I rechecked the patient multiple times and reviewed results of labs and imaging.  She remained comfortable with plan for admission.  I discussed the case with hospitalist who agreed to facilitate admission.    After patient was admitted, did receive notification that beta hydroxybutyrate elevated at 2.6.  CBC without leukocytosis or acute anemia.  Lactate negative.  CT is pending but if this returns with evidence of pneumonia, will likely need antibiotics.  Hospitalist agreed on plan to hold on starting these until CT returns.    I independently reviewed CXR (as noted above), formal radiologist read pending.      Medical Decision Making  Obtained supplemental history:Supplemental history obtained?: Documented in chart and Family Member/Significant Other  Reviewed external records: External records reviewed?: Documented in chart  Care impacted by chronic illness:Chronic Lung Disease, Diabetes, and Hypertension  Care significantly affected by social determinants of health:Access to care - overnight shift   Did you consider but not order tests?: Work up considered but not performed and documented in chart, if applicable  Did you interpret images independently?: Independent interpretation of ECG and images noted in documentation, when applicable.  Consultation discussion with other provider:Did you involve another provider (consultant, , pharmacy, etc.)?: I discussed the care with another health care provider, see documentation for details.  Admit.    Critical care: 60 minutes  excluding separately billable procedures.  Includes bedside management, time reviewing test results, review of records, discussing the case with staff, documenting the medical record and time spent with family members (or surrogate decision makers) discussing specific treatment issues.       ED COURSE  11:40 PM I met the patient and performed my initial interview and exam.    12:10 AM I rechecked and updated the patient.  Patient still having trouble breathing. Will start Bi-Pap.   1:10 AM I rechecked the patient.   1:29 AM I spoke with Dr. Villanueva, hospitalist.   2:00 AM I rechecked the patient.       MEDICATIONS GIVEN IN THE ED  Medications   potassium chloride 10 mEq in 100 mL sterile water infusion (has no administration in time range)   potassium chloride ER (KLOR-CON M) CR tablet 40 mEq (has no administration in time range)   ipratropium - albuterol 0.5 mg/2.5 mg/3 mL (DUONEB) neb solution 3 mL (3 mLs Nebulization $Given 1/7/24 0010)   methylPREDNISolone sodium succinate (solu-MEDROL) injection 125 mg (125 mg Intravenous $Given 1/7/24 0015)   magnesium sulfate 2 g in 50 mL sterile water intermittent infusion (0 g Intravenous Stopped 1/7/24 0120)   ondansetron (ZOFRAN) injection 4 mg (4 mg Intravenous $Given 1/7/24 0015)       NEW PRESCRIPTIONS STARTED AT TODAY'S VISIT  New Prescriptions    No medications on file          =================================================================    Chief Complaint   Patient presents with    Shortness of Breath         HPI:    Patient information was obtained from: Patient and Son    Use of : Son edith Trotter is a 73 year old female who presents with shortness of breath.     History limited secondary to respiratory distress.     Patient has had shortness of breath for a few days that is worsening now prompting an ED visit. Patient does have a history of asthma and uses a nebulizer at home. Has also had a productive cough with upper chest  tightness and nausea. Has been diaphoretic but no fever. History of type 2 diabetes mellitus. Son states he is mildly sick as well. Denies any fever.       RELEVANT HISTORY, MEDICATIONS, & ALLERGIES   Past medical history, surgical history, family history, medications, and allergies reviewed and pertinent noted in HPI.    REVIEW OF SYSTEMS:  A complete review of systems was performed with pertinent positives and negatives noted in the HPI.   Limited by respiratory distress.     PHYSICAL EXAM:    Vitals: /65   Pulse 100   Resp (!) 37   SpO2 100%    General: Alert and interactive.  In moderate respiratory distress.  HENT: Atraumatic. Full AROM of neck. Conjunctiva clear.   Cardiovascular: Tachycardic, regular.  Chest/Pulmonary: Increased work of breathing.  Tachypneic.  Wheezing and coarse, tight breath sounds in all lung fields.  Abdomen: Soft, nondistended. Nontender without guarding or rebound.  Extremities: Normal AROM of all major joints.  No edema of lower extremities.  Skin: Warm and dry. Normal skin color.   Neuro: Speech clear. CNs grossly intact. Moves all extremities spontaneously.   Psych: Normal affect/mood, cooperative, memory appropriate.      LAB  Labs Ordered and Resulted from Time of ED Arrival to Time of ED Departure   BASIC METABOLIC PANEL - Abnormal       Result Value    Sodium 128 (*)     Potassium 3.0 (*)     Chloride 91 (*)     Carbon Dioxide (CO2) 18 (*)     Anion Gap 19 (*)     Urea Nitrogen 18.0      Creatinine 0.81      GFR Estimate 77      Calcium 9.4      Glucose 207 (*)    MAGNESIUM - Abnormal    Magnesium 1.6 (*)    TROPONIN T, HIGH SENSITIVITY - Abnormal    Troponin T, High Sensitivity 15 (*)    PROCALCITONIN - Abnormal    Procalcitonin 0.63 (*)    D DIMER QUANTITATIVE - Abnormal    D-Dimer Quantitative 3.87 (*)    NT PROBNP INPATIENT - Normal    N terminal Pro BNP Inpatient 195     INFLUENZA A/B, RSV, & SARS-COV2 PCR - Normal    Influenza A PCR Negative      Influenza B PCR  Negative      RSV PCR Negative      SARS CoV2 PCR Negative     KETONE BETA-HYDROXYBUTYRATE QUANTITATIVE, RAPID       RADIOLOGY  XR Chest Port 1 View   Final Result   IMPRESSION: Low lung volumes. Heart size normal limits for AP technique. Pulmonary vascularity normal. The lungs are clear.      CT Chest Pulmonary Embolism w Contrast    (Results Pending)       EKG  Performed at: 2340  Impression: Sinus tachycardia.  RBBB.  No clear ischemic changes.  No previous for comparison.  Rate: 107  Rhythm: Sinus   QRS Interval: 144  QTc Interval: 542  Comparison: No previous ECG for comparison.     All laboratory and imaging results and EKG's were personally reviewed and interpreted by myself prior to disposition decision.     I, Arsenio Harris, am serving as a scribe to document services personally performed by Dr. Tracie Iverson based on my observation and the provider's statements to me. I, Tracie Iverson MD attest that Arsenio Harris is acting in a scribe capacity, has observed my performance of the services and has documented them in accordance with my direction.    Tracie Iverson M.D.  Emergency Medicine  [facility] EMERGENCY ROOM  [address] [facility] [id number] [phone]  Dept: [phone]     Tracie Iverson MD  01/07/24 0611

## 2024-01-07 NOTE — PROGRESS NOTES
RT Note:    Called by RN for pt in respiratory distress. Pt in visible respiratory distress when seen RR 40s-60s. Pt was on room air and able to maintain SpO2 >92%, family member at bedside to translate. X3 nebs given about 10 minutes apart per order. Breath sounds more coarse than wheezy. BiPAP started per verbal order. BiPAP settings 12/6 R12 40% adjusting as needed.

## 2024-01-07 NOTE — PHARMACY-ADMISSION MEDICATION HISTORY
Pharmacist Admission Medication History    Admission medication history is complete. The information provided in this note is only as accurate as the sources available at the time of the update.    Information Source(s): Patient, Family member, and CareEverywhere/SureScripts via in-person    Pertinent Information: Patient fills at a pharmacy in [place].    Changes made to PTA medication list:  Added: all listed  Deleted: None  Changed: None    Allergies reviewed with patient and updates made in EHR: yes    Medication History Completed By: Luis Eduardo Murillo [facility] 1/7/2024 9:03 AM    PTA Med List   Medication Sig Last Dose    amLODIPine (NORVASC) 5 MG tablet Take 5 mg by mouth daily 1/5/2024    cetirizine (ZYRTEC) 10 MG tablet Take 10 mg by mouth daily 1/5/2024    clonazePAM (KLONOPIN) 1 MG tablet Take 1 mg by mouth at bedtime 1/5/2024 at pm    FOLINIC-PLUS 4-50-2 MG TABS Take 1 tablet by mouth daily 1/5/2024    gabapentin (NEURONTIN) 600 MG tablet Take 600 mg by mouth daily 1/5/2024    levothyroxine (SYNTHROID/LEVOTHROID) 50 MCG tablet Take 50 mcg by mouth daily 1/6/2024 at AM    losartan (COZAAR) 100 MG tablet Take 100 mg by mouth daily 1/5/2024    LUMIGAN 0.01 % SOLN ophthalmic solution Place 1 drop into both eyes at bedtime 1/5/2024 at PM, not with    montelukast (SINGULAIR) 10 MG tablet Take 10 mg by mouth daily 1/5/2024    OPTIVE 0.5-0.9 % ophthalmic solution Place 1 drop into both eyes 4 times daily 1/5/2024    PARoxetine (PAXIL-CR) 25 MG 24 hr tablet Take 25 mg by mouth every morning 1/5/2024 at AM    QUEtiapine (SEROQUEL) 100 MG tablet Take 100 mg by mouth at bedtime 1/5/2024 at PM    RESTASIS 0.05 % ophthalmic emulsion Place 1 drop into both eyes 2 times daily 1/5/2024    simvastatin (ZOCOR) 40 MG tablet Take 40 mg by mouth at bedtime 1/5/2024 at PM    spironolactone (ALDACTONE) 25 MG tablet Take 25 mg by mouth daily 1/5/2024    theophylline (THEODUR) 300 MG 12 hr tablet Take 300 mg by mouth daily  1/5/2024    XIGDUO XR 5-1000 MG TB24 Take 1 tablet by mouth daily (with breakfast) 1/5/2024

## 2024-01-07 NOTE — PROGRESS NOTES
[facility]    Medicine Progress Note - Hospitalist Service    Date of Admission:  1/6/2024    Assessment & Plan   Ms. Carmel Trotter is a 73 years old woman from [place] with past medical history significant for asthma, hypertension and diabetes who presented to the hospital with worsening shortness of breath and cough.  Was diagnosed with asthma exacerbation due to pneumonia.  She was also found to have hyponatremia most likely hypovolemic due to decreased p.o. intake.  Possible discharge in 1 to 2 days if respiratory status continues to improve and electrolyte derangements resolved..    Acute asthma exacerbation  Community-acquired pneumonia  -1 week of wheezing, productive cough, worsening shortness of breath.  -Elevated D-dimer, however, CT PE ruled out PE showed signs of multifocal pneumonia  -Procalcitonin elevated  -Started on levofloxacin.  -Initially was on BiPAP--> significant improvement in respiratory status over the last 12 hours--> currently saturating around 92% on room air.    Plan  -Continue levofloxacin  -Check for Legionella (had diarrhea and hyponatremia--> although this is most likely due to decreased p.o. intake)  -Continue scheduled DuoNebs 4 times daily  -Continue Solu-Medrol 40 mg daily--> plan to discharge on prednisone 40 mg to complete a course of 5 days  -Will reevaluate on 1/8--> patient is planned to fly to [place] on 1/9 (rediscussed the possibility of postponing her flight)    High anion gap metabolic acidosis (resolved)  -Mildly elevated ketones  -Most likely starvation ketoacidosis--> poor p.o. intake over the last week  -Anion gap resolved    Plan  -Encourage p.o. intake    Hyponatremia  -Most likely hypovolemic hyponatremia  -Sodium improved from 128 130    Plan  -Stop IV fluids  -Encourage p.o. intake  -Discontinue fluid restriction    Hypomagnesemia  Hypokalemia  -Most likely due to diarrhea  -Repleted    Plan  -Continue to monitor and  "replete as indicated    Diabetes  -At home on Xigduo  -Per patient, fingerstick at home usually around 100-150    Plan  -Sliding scale insulin  -If persistent hyperglycemia can start NPH    Hypertension  -At home on amlodipine 5 mg daily, losartan 100 mg daily, spironolactone 40 mg nightly  -Blood pressures currently around 110/60    Plan  -Restart home amlodipine  -Hold losartan and spironolactone, possibly resume on 1/8 if appropriate    Generalized anxiety disorder  -At home on paroxetine, Klonopin nightly    Plan  -Continue home medications    Hypothyroidism    Plan  -Continue home medications            Diet: Combination Diet Moderate Consistent Carb (60 g CHO per Meal) Diet    DVT Prophylaxis: Enoxaparin (Lovenox) SQ  Renteria Catheter: Not present  Lines: None     Cardiac Monitoring: None  Code Status: Full Code      Clinically Significant Risk Factors Present on Admission        # Hypokalemia: Lowest K = 3 mmol/L in last 2 days, will replace as needed  # Hyponatremia: Lowest Na = 128 mmol/L in last 2 days, will monitor as appropriate    # Hypomagnesemia: Lowest Mg = 1.6 mg/dL in last 2 days, will replace as needed  # Anion Gap Metabolic Acidosis: Highest Anion Gap = 19 mmol/L in last 2 days, will monitor and treat as appropriate      # Hypertension: Home medication list includes antihypertensive(s)     # DMII: A1C = 6.8 % (Ref range: <5.7 %) within past 6 months    # Obesity: Estimated body mass index is 36.62 kg/m  as calculated from the following:    Height as of this encounter: 1.499 m (4' 11\").    Weight as of this encounter: 82.2 kg (181 lb 4.8 oz).       # Asthma: noted on problem list        Disposition Plan  Home     Expected Discharge Date: 01/09/2024                    HIPOLITO NOLASCO MD  Hospitalist Service  [facility]  Securely message with [employer] (more info)  Text page via [employer] Paging/Directory "   ______________________________________________________________________    Interval History   Reported significant improvement of her shortness of breath over the last 12 hours.  Continues to experience intermittent cough.  She denies any fever or chills.  She denies any significant chest pain.    Physical Exam   Vital Signs: Temp: 97.9  F (36.6  C) Temp src: Oral BP: 106/57 Pulse: 78   Resp: 22 SpO2: 93 % O2 Device: None (Room air) Oxygen Delivery: 1 LPM  Weight: 181 lbs 4.8 oz    Physical Exam  Constitutional:       General: She is not in acute distress.     Appearance: She is not toxic-appearing.   Cardiovascular:      Rate and Rhythm: Normal rate.   Pulmonary:      Effort: Pulmonary effort is normal. No respiratory distress.      Comments: Mild expiratory wheezing.  No crackles.   Skin:     General: Skin is warm and dry.   Neurological:      General: No focal deficit present.      Mental Status: She is alert.   Psychiatric:         Mood and Affect: Mood normal.          Medical Decision Making       65 MINUTES SPENT BY ME on the date of service doing chart review, history, exam, documentation & further activities per the note.      Data     I have personally reviewed the following data over the past 24 hrs:    10.0  \   11.0 (L)   / 263     130 (L) 96 (L) 16.9 /  262 (H)   3.9 22 0.74 \     Trop: 14 BNP: 195     TSH: N/A T4: N/A A1C: 6.8 (H)     Procal: 0.63 (H) CRP: N/A Lactic Acid: 2.0       INR:  N/A PTT:  N/A   D-dimer:  3.87 (H) Fibrinogen:  N/A       Imaging results reviewed over the past 24 hrs:   Recent Results (from the past 24 hour(s))   XR Chest Port 1 View    Narrative    EXAM: XR CHEST PORT 1 VIEW  LOCATION: [facility]  DATE: 1/7/2024    INDICATION: Shortness of breath  COMPARISON: None.      Impression    IMPRESSION: Low lung volumes. Heart size normal limits for AP technique. Pulmonary vascularity normal. The lungs are clear.   CT Chest Pulmonary Embolism w Contrast     Narrative    EXAM: CT CHEST PULMONARY EMBOLISM W CONTRAST  LOCATION: [facility]  DATE: 1/7/2024    INDICATION: elevated d dimer, dyspnea, respiratory distress  COMPARISON: Portable chest radiograph from 01/07/2024.  TECHNIQUE: CT chest pulmonary angiogram during arterial phase injection of IV contrast. Multiplanar reformats and MIP reconstructions were performed. Dose reduction techniques were used.   CONTRAST: Isovue 370 90ml    FINDINGS:  ANGIOGRAM CHEST: Pulmonary arteries are normal caliber and negative for pulmonary emboli. Thoracic aorta is not well opacified and is  indeterminate for dissection. No CT evidence of right heart strain.    LUNGS AND PLEURA: Bilateral bronchial wall thickening. Patchy bilateral airspace infiltrates throughout both lungs. More consolidative opacification present in the lingula. Calcified right lower lobe granuloma. No pneumothorax or pleural effusion.    MEDIASTINUM/AXILLAE: Heart size within normal limits. No significant pericardial effusion. A few subcentimeter short axis mediastinal lymph nodes.    CORONARY ARTERY CALCIFICATION: Minimal    UPPER ABDOMEN: Normal.    MUSCULOSKELETAL: Mild degenerative disc changes throughout the thoracic spine.      Impression    IMPRESSION:  1.  Negative for pulmonary embolism.    2.  Bilateral bronchial wall thickening with patchy airspace infiltrates throughout both lungs consistent with multifocal pneumonia or pneumonitis.

## 2024-01-07 NOTE — PLAN OF CARE
A/O, VSS on BiPap, 40% FiO2. Afebrile. No c/o chest pain this shift. Endorse back pain r/t body position; pt thought she was not allowed to move around d/t the BiPap; pt was informed she is allowed to move around and educated on importance of repositioning.     Goal Outcome Evaluation:    Problem: Adult Inpatient Plan of Care  Goal: Optimal Comfort and Wellbeing  Outcome: Progressing  Intervention: Monitor Pain and Promote Comfort  Recent Flowsheet Documentation  Taken 1/7/2024 0258 by Layne Hartman, RN  Pain Management Interventions: medication offered but refused     Problem: Gas Exchange Impaired  Goal: Optimal Gas Exchange  Outcome: Progressing

## 2024-01-08 VITALS
SYSTOLIC BLOOD PRESSURE: 130 MMHG | TEMPERATURE: 98.2 F | WEIGHT: 187 LBS | BODY MASS INDEX: 37.7 KG/M2 | HEIGHT: 59 IN | OXYGEN SATURATION: 94 % | HEART RATE: 65 BPM | RESPIRATION RATE: 20 BRPM | DIASTOLIC BLOOD PRESSURE: 74 MMHG

## 2024-01-08 PROBLEM — J18.9 PNEUMONIA OF BOTH LUNGS DUE TO INFECTIOUS ORGANISM, UNSPECIFIED PART OF LUNG: Status: ACTIVE | Noted: 2024-01-08

## 2024-01-08 LAB
ANION GAP SERPL CALCULATED.3IONS-SCNC: 10 MMOL/L (ref 7–15)
BUN SERPL-MCNC: 25.7 MG/DL (ref 8–23)
CALCIUM SERPL-MCNC: 9.2 MG/DL (ref 8.8–10.2)
CHLORIDE SERPL-SCNC: 100 MMOL/L (ref 98–107)
CREAT SERPL-MCNC: 0.91 MG/DL (ref 0.51–0.95)
DEPRECATED HCO3 PLAS-SCNC: 22 MMOL/L (ref 22–29)
EGFRCR SERPLBLD CKD-EPI 2021: 67 ML/MIN/1.73M2
GLUCOSE BLDC GLUCOMTR-MCNC: 302 MG/DL (ref 70–99)
GLUCOSE BLDC GLUCOMTR-MCNC: 311 MG/DL (ref 70–99)
GLUCOSE BLDC GLUCOMTR-MCNC: 316 MG/DL (ref 70–99)
GLUCOSE SERPL-MCNC: 324 MG/DL (ref 70–99)
HOLD SPECIMEN: NORMAL
MAGNESIUM SERPL-MCNC: 2.3 MG/DL (ref 1.7–2.3)
POTASSIUM SERPL-SCNC: 4.1 MMOL/L (ref 3.4–5.3)
SODIUM SERPL-SCNC: 132 MMOL/L (ref 135–145)

## 2024-01-08 PROCEDURE — 96376 TX/PRO/DX INJ SAME DRUG ADON: CPT

## 2024-01-08 PROCEDURE — 82374 ASSAY BLOOD CARBON DIOXIDE: CPT | Performed by: STUDENT IN AN ORGANIZED HEALTH CARE EDUCATION/TRAINING PROGRAM

## 2024-01-08 PROCEDURE — 93005 ELECTROCARDIOGRAM TRACING: CPT

## 2024-01-08 PROCEDURE — 94640 AIRWAY INHALATION TREATMENT: CPT | Mod: 76

## 2024-01-08 PROCEDURE — 999N000157 HC STATISTIC RCP TIME EA 10 MIN

## 2024-01-08 PROCEDURE — G0378 HOSPITAL OBSERVATION PER HR: HCPCS

## 2024-01-08 PROCEDURE — 83735 ASSAY OF MAGNESIUM: CPT | Performed by: HOSPITALIST

## 2024-01-08 PROCEDURE — 250N000013 HC RX MED GY IP 250 OP 250 PS 637: Performed by: HOSPITALIST

## 2024-01-08 PROCEDURE — 93010 ELECTROCARDIOGRAM REPORT: CPT | Performed by: GENERAL ACUTE CARE HOSPITAL

## 2024-01-08 PROCEDURE — 96372 THER/PROPH/DIAG INJ SC/IM: CPT | Performed by: HOSPITALIST

## 2024-01-08 PROCEDURE — 94799 UNLISTED PULMONARY SVC/PX: CPT

## 2024-01-08 PROCEDURE — 36415 COLL VENOUS BLD VENIPUNCTURE: CPT | Performed by: STUDENT IN AN ORGANIZED HEALTH CARE EDUCATION/TRAINING PROGRAM

## 2024-01-08 PROCEDURE — 250N000009 HC RX 250: Performed by: HOSPITALIST

## 2024-01-08 PROCEDURE — 94640 AIRWAY INHALATION TREATMENT: CPT

## 2024-01-08 PROCEDURE — 250N000013 HC RX MED GY IP 250 OP 250 PS 637: Performed by: STUDENT IN AN ORGANIZED HEALTH CARE EDUCATION/TRAINING PROGRAM

## 2024-01-08 PROCEDURE — 99239 HOSP IP/OBS DSCHRG MGMT >30: CPT | Performed by: STUDENT IN AN ORGANIZED HEALTH CARE EDUCATION/TRAINING PROGRAM

## 2024-01-08 PROCEDURE — 250N000011 HC RX IP 250 OP 636: Performed by: HOSPITALIST

## 2024-01-08 RX ORDER — PREDNISONE 20 MG/1
20 TABLET ORAL DAILY
Qty: 3 TABLET | Refills: 0 | Status: SHIPPED | OUTPATIENT
Start: 2024-01-08 | End: 2024-01-11

## 2024-01-08 RX ORDER — LEVOFLOXACIN 750 MG/1
750 TABLET, FILM COATED ORAL DAILY
Qty: 3 TABLET | Refills: 0 | Status: SHIPPED | OUTPATIENT
Start: 2024-01-08 | End: 2024-01-11

## 2024-01-08 RX ADMIN — AMLODIPINE BESYLATE 5 MG: 5 TABLET ORAL at 07:54

## 2024-01-08 RX ADMIN — MONTELUKAST SODIUM 10 MG: 10 TABLET, COATED ORAL at 07:54

## 2024-01-08 RX ADMIN — GUAIFENESIN AND DEXTROMETHORPHAN 10 ML: 100; 10 SYRUP ORAL at 03:49

## 2024-01-08 RX ADMIN — IPRATROPIUM BROMIDE AND ALBUTEROL SULFATE 3 ML: .5; 3 SOLUTION RESPIRATORY (INHALATION) at 11:34

## 2024-01-08 RX ADMIN — PAROXETINE HYDROCHLORIDE 20 MG: 20 TABLET, FILM COATED ORAL at 09:13

## 2024-01-08 RX ADMIN — LEVOTHYROXINE SODIUM 50 MCG: 0.05 TABLET ORAL at 09:12

## 2024-01-08 RX ADMIN — METHYLPREDNISOLONE SODIUM SUCCINATE 40 MG: 40 INJECTION, POWDER, FOR SOLUTION INTRAMUSCULAR; INTRAVENOUS at 07:53

## 2024-01-08 RX ADMIN — ALBUTEROL SULFATE 2.5 MG: 2.5 SOLUTION RESPIRATORY (INHALATION) at 01:04

## 2024-01-08 RX ADMIN — CARBOXYMETHYLCELLULOSE SODIUM 1 DROP: 10 GEL OPHTHALMIC at 07:54

## 2024-01-08 RX ADMIN — IPRATROPIUM BROMIDE AND ALBUTEROL SULFATE 3 ML: .5; 3 SOLUTION RESPIRATORY (INHALATION) at 07:36

## 2024-01-08 RX ADMIN — CYCLOSPORINE 1 DROP: 0.5 EMULSION OPHTHALMIC at 09:13

## 2024-01-08 RX ADMIN — LEVOFLOXACIN 500 MG: 500 INJECTION, SOLUTION INTRAVENOUS at 02:26

## 2024-01-08 RX ADMIN — ENOXAPARIN SODIUM 40 MG: 100 INJECTION SUBCUTANEOUS at 06:12

## 2024-01-08 RX ADMIN — GABAPENTIN 600 MG: 600 TABLET, FILM COATED ORAL at 07:54

## 2024-01-08 ASSESSMENT — ACTIVITIES OF DAILY LIVING (ADL)
ADLS_ACUITY_SCORE: 22

## 2024-01-08 NOTE — PROGRESS NOTES
Care Management Discharge Note    Discharge Date: 01/08/2024       Discharge Disposition: Home    Discharge Services: None    Discharge DME: None    Discharge Transportation:      Private pay costs discussed: Not applicable    Education Provided on the Discharge Plan: No  Persons Notified of Discharge Plans: yse  Patient/Family in Agreement with the Plan: yes    Handoff Referral Completed: Yes    Additional Information:  No needs anticipated from CM at discharge per pt; independent at baseline. Pt to follow up with PCP when returns home post discharge if needs arise. Family to transport.  3:02 PM    LESVIA Li  1/8/2024

## 2024-01-08 NOTE — PLAN OF CARE
"Problem: Gas Exchange Impaired  Goal: Optimal Gas Exchange  Outcome: Progressing     Problem: Pneumonia  Goal: Fluid Balance  Outcome: Progressing     /69 (BP Location: Right arm, Patient Position: Chair, Cuff Size: Adult Large)   Pulse 87   Temp 98  F (36.7  C) (Oral)   Resp 24   Ht 1.499 m (4' 11\")   Wt 82.2 kg (181 lb 4.8 oz)   SpO2 94%   BMI 36.62 kg/m      Pt is A/O x 4, utilizing  via [employer]  services on [employer] for all communication. VSS. SpO2 94% on RA. Pt has some expiratory wheeze on auscultation and reports short of breath when she is active or ambulating. Pt is independent in room and calls appropriately for assist. Pt has visitor exception for language; son that came to visit was supportive and helpful in communication.    Gave 5 units sliding scale insulin for coverage of HS . Tele reads NSR.                          "

## 2024-01-08 NOTE — PLAN OF CARE
Goal Outcome Evaluation:  Patient is alert and oriented X 4.  services utilized. Patient noted with a non cough, dyspnea on exertion and lung sounds with expiratory wheezes. Denied pain. RT administered PRN nebulizer treatment. Administered PRN cough medication. Oxygen on 2L via NC to maintain SATS above 92%. Tolerated IV antibiotics. IV saline locked. Ambulated to the bathroom independently. Telemetry reading: normal sinus Rhythm. Call light within reach.

## 2024-01-08 NOTE — PROGRESS NOTES
"/62 (BP Location: Right arm)   Pulse 76   Temp 97.3  F (36.3  C) (Oral)   Resp 20   Ht 1.499 m (4' 11\")   Wt 82.2 kg (181 lb 4.8 oz)   SpO2 92%   BMI 36.62 kg/m        The PT was provided multiple nebs this shift (one scheduled, the other a PRN per RN request). Also, during her scheduled neb treatment, she performed an inline flutter valve.    As long as the PT is awake, BS are wheezy both pre and post neb (no change for me, but day shift commented that they \"increased aeration\"). Of note, at least for me and on two separate occasions, this wheezing was appreciated from across the room. Upon ausculation, I heard wheezing over the throat and lungs, with the throat wheezes being the louder of the two --which makes me suspicious that lung field wheezing is referring to an [employer] obstructive process, and not the other way around.     RT will follow as directed.  "

## 2024-01-08 NOTE — DISCHARGE SUMMARY
"[facility]  Hospitalist Discharge Summary      Date of Admission:  1/6/2024  Date of Discharge:  1/8/2024  1:00 PM  Discharging Provider: Dennys Caro MD  Discharge Service: Hospitalist Service    Discharge Diagnoses   Acute Asthma Exacerbation   Secondary to community-acquired Pneumonia  Hyponatremia  Prolonged Qtc (rechecked and < 500)   Hypomagnesemia   Hypokalemia     Clinically Significant Risk Factors     # DMII: A1C = 6.8 % (Ref range: <5.7 %) within past 6 months    # Obesity: Estimated body mass index is 37.77 kg/m  as calculated from the following:    Height as of this encounter: 1.499 m (4' 11\").    Weight as of this encounter: 84.8 kg (187 lb).       Follow-ups Needed After Discharge   Follow-up Appointments     Follow-up and recommended labs and tests       Follow up with primary care provider, Physician No Ref-Primary, within 7   days for hospital follow- up.             Unresulted Labs Ordered in the Past 30 Days of this Admission       No orders found from 12/7/2023 to 1/7/2024.        These results will be followed up by [facility]    Discharge Disposition   Discharged to home  Condition at discharge: Stable    Hospital Course   73-year-old female from [place] with a past medical history of hypertension and diabetes who was admitted on 1/7/2024 with primary symptoms of shortness of breath and cough.  Treated for asthma exacerbation secondary to community-acquired pneumonia.  Also found with hyponatremia suspected most likely hypovolemic etiology (relative ADH release from decreased p.o. intake and perfusion), sodium trended towards improvement appropriately from 128-130, to 132.  The patient was weaned completely off of her oxygen and tolerated room air.  She underwent an ambulation test and did not require any additional oxygen with exertion and movement on 1/8.      As she returned back to her clinical baseline, she will be discharged to back home in stable medical " condition.  She received levofloxacin while here as well as methylprednisolone; she did have a prolonged QTc which was  rechecked on 1/8/2024 and was less than 500.  Pharmacy cleared to continue levofloxacin.  She was discharged with 3 more days of levofloxacin and also 3 more days of oral prednisone (lower dose 20mg).  She was instructed to follow-up with her primary care provider for a posthospital visit and also recheck her BMP; this was also communicated to the patient's son, in addition to the patient directly with the use of a .      Consultations This Hospital Stay   CARE MANAGEMENT / SOCIAL WORK IP CONSULT    Code Status   Full Code    Time Spent on this Encounter   I, Dennys Caro MD, personally saw the patient today and spent greater than 30 minutes discharging this patient.       Dennys Caro MD  [address] [address]  Phone: [phone]  Fax: [phone]  ______________________________________________________________________    Physical Exam   Vital Signs: Temp: 98.2  F (36.8  C) Temp src: Oral BP: 130/74 Pulse: 65   Resp: 20 SpO2: 94 % O2 Device: None (Room air)   Weight: 187 lbs 0 oz    General: alert, oriented, and in no acute distress  Pulmonary: clear to auscultation bilaterally, normal respiratory effort, on room air, no rales or wheezes or evidence of accessory muscle use  CVS: regular rate and rhythm, no murmurs, rubs, or gallops; no blatant jugular venous distention; no extremity edema and extremities are warm to the touch  GI: soft, nontender, BS+, no rebound or guarding, no conspicuous organomegaly   Neuro: nonfocal, moves all extremities of own volition  Psych: appropriate           Primary Care Physician   Physician No Ref-Primary    Discharge Orders      Reason for your hospital stay    -Acute Asthma Exacerbation likely from a community-acquired Pneumonia; take 3 more days of once-daily antibiotic and 3  "more days of once-daily steroid    -Hyponatremia, or low-sodium; it is improving but have your primary care doctor recheck \"BMP\" or basic metabolic panel     Follow-up and recommended labs and tests     Follow up with primary care provider, Physician No Ref-Primary, within 7 days for hospital follow- up.     Activity    Your activity upon discharge: activity as tolerated     Diet    Follow this diet upon discharge: Orders Placed This Encounter      Combination Diet Moderate Consistent Carb (60 g CHO per Meal) Diet      Significant Results and Procedures   Results for orders placed or performed during the hospital encounter of 01/06/24   XR Chest Port 1 View    Narrative    EXAM: XR CHEST PORT 1 VIEW  LOCATION: [facility]  DATE: 1/7/2024    INDICATION: Shortness of breath  COMPARISON: None.      Impression    IMPRESSION: Low lung volumes. Heart size normal limits for AP technique. Pulmonary vascularity normal. The lungs are clear.   CT Chest Pulmonary Embolism w Contrast    Narrative    EXAM: CT CHEST PULMONARY EMBOLISM W CONTRAST  LOCATION: [facility] [place]  DATE: 1/7/2024    INDICATION: elevated d dimer, dyspnea, respiratory distress  COMPARISON: Portable chest radiograph from 01/07/2024.  TECHNIQUE: CT chest pulmonary angiogram during arterial phase injection of IV contrast. Multiplanar reformats and MIP reconstructions were performed. Dose reduction techniques were used.   CONTRAST: Isovue 370 90ml    FINDINGS:  ANGIOGRAM CHEST: Pulmonary arteries are normal caliber and negative for pulmonary emboli. Thoracic aorta is not well opacified and is  indeterminate for dissection. No CT evidence of right heart strain.    LUNGS AND PLEURA: Bilateral bronchial wall thickening. Patchy bilateral airspace infiltrates throughout both lungs. More consolidative opacification present in the lingula. Calcified right lower lobe granuloma. No pneumothorax or pleural " effusion.    MEDIASTINUM/AXILLAE: Heart size within normal limits. No significant pericardial effusion. A few subcentimeter short axis mediastinal lymph nodes.    CORONARY ARTERY CALCIFICATION: Minimal    UPPER ABDOMEN: Normal.    MUSCULOSKELETAL: Mild degenerative disc changes throughout the thoracic spine.      Impression    IMPRESSION:  1.  Negative for pulmonary embolism.    2.  Bilateral bronchial wall thickening with patchy airspace infiltrates throughout both lungs consistent with multifocal pneumonia or pneumonitis.       Discharge Medications   Discharge Medication List as of 1/8/2024 11:23 AM        START taking these medications    Details   levofloxacin (LEVAQUIN) 750 MG tablet Take 1 tablet (750 mg) by mouth daily for 3 days, Disp-3 tablet, R-0, E-PrescribeCompleting a course of antibiotics from a hospitalization.      predniSONE (DELTASONE) 20 MG tablet Take 1 tablet (20 mg) by mouth daily for 3 days, Disp-3 tablet, R-0, E-PrescribeCompleting a course from hospitalization.           CONTINUE these medications which have NOT CHANGED    Details   amLODIPine (NORVASC) 5 MG tablet Take 5 mg by mouth daily, Historical      cetirizine (ZYRTEC) 10 MG tablet Take 10 mg by mouth daily, Historical      clonazePAM (KLONOPIN) 1 MG tablet Take 1 mg by mouth at bedtime, Historical      FOLINIC-PLUS 4-50-2 MG TABS Take 1 tablet by mouth daily, SHANDRA, Historical      gabapentin (NEURONTIN) 600 MG tablet Take 600 mg by mouth daily, Historical      levothyroxine (SYNTHROID/LEVOTHROID) 50 MCG tablet Take 50 mcg by mouth daily, Historical      losartan (COZAAR) 100 MG tablet Take 100 mg by mouth daily, Historical      LUMIGAN 0.01 % SOLN ophthalmic solution Place 1 drop into both eyes at bedtime, SHANDRA, Historical      montelukast (SINGULAIR) 10 MG tablet Take 10 mg by mouth daily, Historical      OPTIVE 0.5-0.9 % ophthalmic solution Place 1 drop into both eyes 4 times daily, SHANDRA, Historical      PARoxetine (PAXIL-CR) 25 MG  24 hr tablet Take 25 mg by mouth every morning, Historical      QUEtiapine (SEROQUEL) 100 MG tablet Take 100 mg by mouth at bedtime, Historical      RESTASIS 0.05 % ophthalmic emulsion Place 1 drop into both eyes 2 times daily, SHANDRA, Historical      simvastatin (ZOCOR) 40 MG tablet Take 40 mg by mouth at bedtime, Historical      spironolactone (ALDACTONE) 25 MG tablet Take 25 mg by mouth daily, Historical      theophylline (THEODUR) 300 MG 12 hr tablet Take 300 mg by mouth daily, Historical      XIGDUO XR 5-1000 MG TB24 Take 1 tablet by mouth daily (with breakfast), SHANDRA, Historical           Allergies   Allergies   Allergen Reactions    Penicillins Rash

## 2024-01-09 ENCOUNTER — PATIENT OUTREACH (OUTPATIENT)
Dept: CARE COORDINATION | Facility: CLINIC | Age: 74
End: 2024-01-09
Payer: COMMERCIAL

## 2024-01-09 LAB
ATRIAL RATE - MUSE: 86 BPM
DIASTOLIC BLOOD PRESSURE - MUSE: NORMAL MMHG
INTERPRETATION ECG - MUSE: NORMAL
P AXIS - MUSE: 69 DEGREES
PR INTERVAL - MUSE: 138 MS
QRS DURATION - MUSE: 150 MS
QT - MUSE: 412 MS
QTC - MUSE: 493 MS
R AXIS - MUSE: 88 DEGREES
SYSTOLIC BLOOD PRESSURE - MUSE: NORMAL MMHG
T AXIS - MUSE: 30 DEGREES
VENTRICULAR RATE- MUSE: 86 BPM

## 2024-01-09 NOTE — PROGRESS NOTES
Clinic Care Coordination Contact  [facility]    Clinical Data: Care Coordinator Outreach    Outreach Documentation Number of Outreach Attempt   1/9/2024   1:36 PM 1       Left message with son with call back information he stated his mom was fine and on her way flying to [place].      Care Coordinator will do no further outreaches at this time.    Ludmila Hugo  [phone]  Care

## 2024-05-19 ENCOUNTER — HEALTH MAINTENANCE LETTER (OUTPATIENT)
Age: 74
End: 2024-05-19

## 2025-06-08 ENCOUNTER — HEALTH MAINTENANCE LETTER (OUTPATIENT)
Age: 75
End: 2025-06-08